# Patient Record
Sex: MALE | Race: WHITE | Employment: UNEMPLOYED | URBAN - METROPOLITAN AREA
[De-identification: names, ages, dates, MRNs, and addresses within clinical notes are randomized per-mention and may not be internally consistent; named-entity substitution may affect disease eponyms.]

---

## 2022-11-18 ENCOUNTER — TELEPHONE (OUTPATIENT)
Dept: FAMILY MEDICINE | Facility: CLINIC | Age: 27
End: 2022-11-18
Payer: COMMERCIAL

## 2022-11-18 NOTE — TELEPHONE ENCOUNTER
Spoke to patient, advised that we do not have any available appointments today. Suggested [facility] but declined by patient. Scheduled for Monday with Dr. Farrell. Patient stated understanding.

## 2022-11-18 NOTE — TELEPHONE ENCOUNTER
----- Message from Cathi Winkler, Patient Care Assistant sent at 11/18/2022 10:29 AM CST -----  Regarding: appointment  Contact: pt  Type:  Sooner Appointment Request    Caller is requesting a sooner appointment.  Caller declined first available appointment listed below.  Caller will not accept being placed on the waitlist and is requesting a message be sent to doctor.    Name of Caller:  pt   When is the first available appointment?  11/22/22  Symptoms:  ear pain   Best Call Back Number:  [phone] (home)     Additional Information:  please call pt to advise. Thanks!